# Patient Record
Sex: FEMALE | Race: BLACK OR AFRICAN AMERICAN | Employment: STUDENT | URBAN - METROPOLITAN AREA
[De-identification: names, ages, dates, MRNs, and addresses within clinical notes are randomized per-mention and may not be internally consistent; named-entity substitution may affect disease eponyms.]

---

## 2018-09-17 ENCOUNTER — APPOINTMENT (OUTPATIENT)
Dept: GENERAL RADIOLOGY | Age: 23
End: 2018-09-17
Payer: MEDICAID

## 2018-09-17 ENCOUNTER — HOSPITAL ENCOUNTER (EMERGENCY)
Age: 23
Discharge: HOME | End: 2018-09-17
Attending: EMERGENCY MEDICINE
Payer: MEDICAID

## 2018-09-17 VITALS
BODY MASS INDEX: 21.58 KG/M2 | SYSTOLIC BLOOD PRESSURE: 124 MMHG | HEIGHT: 64 IN | HEART RATE: 95 BPM | TEMPERATURE: 98.6 F | DIASTOLIC BLOOD PRESSURE: 92 MMHG | RESPIRATION RATE: 16 BRPM | WEIGHT: 126.4 LBS | OXYGEN SATURATION: 100 %

## 2018-09-17 DIAGNOSIS — J06.9 VIRAL URI WITH COUGH: ICD-10-CM

## 2018-09-17 DIAGNOSIS — R05.9 COUGH: ICD-10-CM

## 2018-09-17 DIAGNOSIS — R07.89 CHEST WALL PAIN: ICD-10-CM

## 2018-09-17 DIAGNOSIS — J40 BRONCHITIS: Primary | ICD-10-CM

## 2018-09-17 PROCEDURE — 71046 X-RAY EXAM CHEST 2 VIEWS: CPT

## 2018-09-17 PROCEDURE — 99283 EMERGENCY DEPT VISIT LOW MDM: CPT

## 2018-09-17 RX ORDER — IBUPROFEN 600 MG/1
600 TABLET ORAL EVERY 6 HOURS PRN
Qty: 30 TABLET | Refills: 0 | Status: SHIPPED | OUTPATIENT
Start: 2018-09-17

## 2018-09-17 RX ORDER — GUAIFENESIN AND DEXTROMETHORPHAN HYDROBROMIDE 600; 30 MG/1; MG/1
1 TABLET, EXTENDED RELEASE ORAL EVERY 12 HOURS PRN
Qty: 20 TABLET | Refills: 0 | Status: SHIPPED | OUTPATIENT
Start: 2018-09-17

## 2018-09-17 ASSESSMENT — PAIN DESCRIPTION - FREQUENCY: FREQUENCY: CONTINUOUS

## 2018-09-17 ASSESSMENT — PAIN SCALES - GENERAL: PAINLEVEL_OUTOF10: 9

## 2018-09-17 ASSESSMENT — PAIN DESCRIPTION - PAIN TYPE: TYPE: ACUTE PAIN

## 2018-09-17 ASSESSMENT — PAIN DESCRIPTION - LOCATION: LOCATION: HEAD;CHEST

## 2018-09-17 ASSESSMENT — PAIN DESCRIPTION - DESCRIPTORS: DESCRIPTORS: PRESSURE

## 2019-05-05 ENCOUNTER — HOSPITAL ENCOUNTER (EMERGENCY)
Age: 24
Discharge: HOME | End: 2019-05-05
Attending: EMERGENCY MEDICINE
Payer: MEDICAID

## 2019-05-05 VITALS
SYSTOLIC BLOOD PRESSURE: 131 MMHG | RESPIRATION RATE: 18 BRPM | TEMPERATURE: 98.8 F | HEART RATE: 99 BPM | DIASTOLIC BLOOD PRESSURE: 81 MMHG | OXYGEN SATURATION: 100 %

## 2019-05-05 DIAGNOSIS — J40 BRONCHITIS: ICD-10-CM

## 2019-05-05 DIAGNOSIS — J06.9 VIRAL URI WITH COUGH: Primary | ICD-10-CM

## 2019-05-05 PROCEDURE — 99283 EMERGENCY DEPT VISIT LOW MDM: CPT

## 2019-05-05 ASSESSMENT — PAIN DESCRIPTION - FREQUENCY: FREQUENCY: CONTINUOUS

## 2019-05-05 ASSESSMENT — PAIN DESCRIPTION - PAIN TYPE: TYPE: ACUTE PAIN

## 2019-05-05 ASSESSMENT — PAIN SCALES - GENERAL: PAINLEVEL_OUTOF10: 8

## 2019-05-05 ASSESSMENT — PAIN DESCRIPTION - DESCRIPTORS: DESCRIPTORS: ACHING

## 2019-05-05 ASSESSMENT — PAIN DESCRIPTION - LOCATION: LOCATION: GENERALIZED

## 2019-05-05 NOTE — ED PROVIDER NOTES
TRIAGE CHIEF COMPLAINT:   Chief Complaint   Patient presents with    Cough    Hemoptysis    Sweats         HPI: Kofi Valadez is a 27 y.o. female who presents to the Emergency Department with complaint of Head congestion, cough and chills. She states her symptoms are getting better but this morning when she cleared her knows and back of throat  She saw some yellow sputum with a few tinges of blood. Denies actually coughing blood up. She started 4 days ago with sore throat. She is unsure about fever but has had chills. She had some transient bilateral ear pain but that resolved. Denies myalgias. She has no abdominal pain and vomiting or bowel complaint. Denies UTI symptoms. She has no history of heart or lung problems. She has never smoked. REVIEW OF SYSTEMS:  6 systems reviewed. Pertinent positives per HPI. Otherwise noted to be negative. Nursing notes reviewed and agree with above. Past medical/surgical history reviewed. MEDICATIONS   Patient's Medications   New Prescriptions    No medications on file   Previous Medications    DEXTROMETHORPHAN-GUAIFENESIN (MUCINEX DM)  MG TB12    Take 1 tablet by mouth every 12 hours as needed (for cough)    IBUPROFEN (ADVIL;MOTRIN) 600 MG TABLET    Take 1 tablet by mouth every 6 hours as needed for Pain   Modified Medications    No medications on file   Discontinued Medications    No medications on file         ALLERGIES No Known Allergies      /81   Pulse 99   Temp 98.8 °F (37.1 °C) (Oral)   Resp 18   SpO2 100%   General:  No acute distress. Non toxic appearance. Not pale or diaphoretic. No respiratory distress. No cough. Head:   Normocephalic and atraumatic  Eyes:   Conjunctiva clear, VICKI, EOM's intact. Sclera anicteric. ENT:   Mucous membranes moist. TMs are both normal.  Nose shows minimal clear rhinorrhea with no evidence of blood. No sinus tenderness. Oropharynx is clear without redness, swelling or exudate. Neck:   Supple. No adenopathy or jugular venous distension  Lungs/Chest:  No respiratory distress. Lungs are clear bilaterally. CVS:   Regular rate and rhythm  Abdomen:  Soft and nontender  Extremities:  Full range of motion  Skin:   No rashes or lesions to exposed skin  Back:   No CVA tenderness  Neuro:  Alert and OX3. Speech clear and appropriate. No focal weakness. Normal sensation in all extremities. Gait normal.  Psych:   Affect normal. Mood normal        RADIOLOGY:      LAB      ED COURSE / MDM:  68-year-old female with 4 day history of viral URI symptoms  And cough. She had transient ear pain and sore throat. Recheck temperature was normal.  O2 sat is 100% on room air. She is not coughing currently. She noted a few flecks of blood in some nasal/pharyngeal mucus once this morning. No clinical evidence for pneumonia, strep throat or otitis media. She does not smoke. We discussed chest x-ray but the patient declined chest x-ray. I feel this is a viral illness. Reassured her and recommended continued symptomatic treatment. I discussed with Giovany Todd the results of evaluation in the Emergency Department, diagnosis, care and prognosis. The plan is to discharge to home. The patient is in agreement with the plan and questions have been answered. I also discussed with the patient and/or family the reasons which may require a return visit and the importance of follow-up care.        (Please note that portions of this note may have been completed with a voice recognition program.  Efforts were made to edit the dictation but occasionally words are mis-transcribed)        FINAL IMPRESSION:  1 -- viral URI with cough  2 -- bronchitis                    Breanne Hernandez MD  05/05/19 [id number]

## 2019-05-05 NOTE — ED NOTES
Patient verbalized understanding of d/c instructions. Patient left the ED and instructed on follow up.         Ella Correia RN  05/05/19 0105